# Patient Record
Sex: MALE | Race: WHITE | URBAN - METROPOLITAN AREA
[De-identification: names, ages, dates, MRNs, and addresses within clinical notes are randomized per-mention and may not be internally consistent; named-entity substitution may affect disease eponyms.]

---

## 2018-05-17 ENCOUNTER — TELEPHONE (OUTPATIENT)
Dept: NEUROLOGY | Facility: CLINIC | Age: 58
End: 2018-05-17

## 2018-05-17 NOTE — TELEPHONE ENCOUNTER
[facility] Call Center    Phone Message    May a detailed message be left on voicemail: yes    Reason for Call: Other: Per call from Penny (wife) PT was a PT of Dr Cooley at [facility] many years ago before Dr Cooley moved to the .  [place] is requesting a call back from Dr Cooley to see if PT can be evaluated if his neurological issues and disability is related water poisoning at [facility] between [phone]. Chevy Francis PT is a [facility] PT and Dr Solitario is a friend      Action Taken: Message routed to:  [facility] ([facility]): Neurology

## 2018-05-18 ENCOUNTER — TELEPHONE (OUTPATIENT)
Dept: NEUROLOGY | Facility: CLINIC | Age: 58
End: 2018-05-18

## 2018-05-18 NOTE — TELEPHONE ENCOUNTER
Patients Wife called and asked if Ilan should schedule an appointment with Dr. Cooley because the [facility] is having a huge lawsuit because they found water poisoning at [facility] between [phone], and Ilan was there for over 1 year (they said drinking the water for 1 month caused neurological issues in other Veterans). They are wondering if [facility] issues could be related to this in ANY way, and if yes they want to set up to be new patients at this clinic to talk with Dr. Horvath. Sent this message to Dr. Cooley, and awaiting his response to either have schedulers set up appointment or I will call them back.